# Patient Record
(demographics unavailable — no encounter records)

---

## 2025-03-05 NOTE — HISTORY OF PRESENT ILLNESS
[de-identified] : 72-year-old man who began experiencing hearing loss and tinnitus about 1.5 years ago. 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 As a result, he underwent Gamma Knife radiosurgery on October 8, 2024, receiving a single 12 Gy dose.   MRI performed the same day confirmed a 0.8 x 1.4 x 0.5 cm lesion.  Today, he comes in for follow-up because he lives in [place]. He reports no new neurological symptoms. R side hearing similar, with slight worsening. Tinnitus stable.

## 2025-03-05 NOTE — HISTORY OF PRESENT ILLNESS
[de-identified] : 72-year-old man who began experiencing hearing loss and tinnitus about 1.5 years ago. 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 As a result, he underwent Gamma Knife radiosurgery on October 8, 2024, receiving a single 12 Gy dose.   MRI performed the same day confirmed a 0.8 x 1.4 x 0.5 cm lesion.  Today, he comes in for follow-up because he lives in [place]. He reports no new neurological symptoms. R side hearing similar, with slight worsening. Tinnitus stable.

## 2025-03-05 NOTE — ASSESSMENT
[FreeTextEntry1] :  72-year-old male, status post Gamma Knife for vestibular schwannoma with stable post-treatment course.  Plan: - MRI next week - Audiogram to assess his hearing